# Patient Record
Sex: MALE | Race: WHITE | NOT HISPANIC OR LATINO | Employment: OTHER | URBAN - METROPOLITAN AREA
[De-identification: names, ages, dates, MRNs, and addresses within clinical notes are randomized per-mention and may not be internally consistent; named-entity substitution may affect disease eponyms.]

---

## 2020-02-01 ENCOUNTER — HOSPITAL ENCOUNTER (INPATIENT)
Facility: HOSPITAL | Age: 75
LOS: 1 days | Discharge: HOME | End: 2020-02-02
Attending: INTERNAL MEDICINE | Admitting: INTERNAL MEDICINE

## 2020-02-01 DIAGNOSIS — K92.2 GASTROINTESTINAL HEMORRHAGE, UNSPECIFIED GASTROINTESTINAL HEMORRHAGE TYPE: Primary | ICD-10-CM

## 2020-02-01 PROBLEM — S80.12XD: Status: ACTIVE | Noted: 2020-02-01

## 2020-02-01 PROBLEM — E78.5 HLD (HYPERLIPIDEMIA): Status: ACTIVE | Noted: 2020-02-01

## 2020-02-01 PROBLEM — R29.6 FREQUENT FALLS: Status: ACTIVE | Noted: 2020-02-01

## 2020-02-01 PROBLEM — Z86.79 HISTORY OF SUBARACHNOID HEMORRHAGE: Status: ACTIVE | Noted: 2020-02-01

## 2020-02-01 PROBLEM — I10 CHRONIC HYPERTENSION: Status: ACTIVE | Noted: 2020-02-01

## 2020-02-01 PROBLEM — I25.10 CAD (CORONARY ARTERY DISEASE): Status: ACTIVE | Noted: 2020-02-01

## 2020-02-01 LAB
ALBUMIN SERPL-MCNC: 4 G/DL (ref 3.5–5.2)
ALBUMIN/GLOB SERPL: 2.4 G/DL
ALP SERPL-CCNC: 47 U/L (ref 39–117)
ALT SERPL W P-5'-P-CCNC: 14 U/L (ref 1–41)
ANION GAP SERPL CALCULATED.3IONS-SCNC: 10.9 MMOL/L (ref 5–15)
APTT PPP: 31.2 SECONDS (ref 22.7–35.4)
AST SERPL-CCNC: 14 U/L (ref 1–40)
BASOPHILS # BLD AUTO: 0.01 10*3/MM3 (ref 0–0.2)
BASOPHILS NFR BLD AUTO: 0.2 % (ref 0–1.5)
BILIRUB SERPL-MCNC: 0.2 MG/DL (ref 0.2–1.2)
BUN BLD-MCNC: 19 MG/DL (ref 8–23)
BUN/CREAT SERPL: 20.4 (ref 7–25)
CALCIUM SPEC-SCNC: 8.5 MG/DL (ref 8.6–10.5)
CHLORIDE SERPL-SCNC: 102 MMOL/L (ref 98–107)
CO2 SERPL-SCNC: 26.1 MMOL/L (ref 22–29)
CREAT BLD-MCNC: 0.93 MG/DL (ref 0.76–1.27)
DEPRECATED RDW RBC AUTO: 45.3 FL (ref 37–54)
EOSINOPHIL # BLD AUTO: 0.08 10*3/MM3 (ref 0–0.4)
EOSINOPHIL NFR BLD AUTO: 1.5 % (ref 0.3–6.2)
ERYTHROCYTE [DISTWIDTH] IN BLOOD BY AUTOMATED COUNT: 13.4 % (ref 12.3–15.4)
GFR SERPL CREATININE-BSD FRML MDRD: 79 ML/MIN/1.73
GLOBULIN UR ELPH-MCNC: 1.7 GM/DL
GLUCOSE BLD-MCNC: 98 MG/DL (ref 65–99)
HCT VFR BLD AUTO: 36.5 % (ref 37.5–51)
HCT VFR BLD AUTO: 37.5 % (ref 37.5–51)
HEMOCCULT STL QL: POSITIVE
HGB BLD-MCNC: 12.2 G/DL (ref 13–17.7)
HGB BLD-MCNC: 12.8 G/DL (ref 13–17.7)
IMM GRANULOCYTES # BLD AUTO: 0.05 10*3/MM3 (ref 0–0.05)
IMM GRANULOCYTES NFR BLD AUTO: 1 % (ref 0–0.5)
INR PPP: 1 (ref 0.9–1.1)
LYMPHOCYTES # BLD AUTO: 1.26 10*3/MM3 (ref 0.7–3.1)
LYMPHOCYTES NFR BLD AUTO: 24 % (ref 19.6–45.3)
MCH RBC QN AUTO: 31.8 PG (ref 26.6–33)
MCHC RBC AUTO-ENTMCNC: 34.1 G/DL (ref 31.5–35.7)
MCV RBC AUTO: 93.3 FL (ref 79–97)
MONOCYTES # BLD AUTO: 0.4 10*3/MM3 (ref 0.1–0.9)
MONOCYTES NFR BLD AUTO: 7.6 % (ref 5–12)
NEUTROPHILS # BLD AUTO: 3.45 10*3/MM3 (ref 1.7–7)
NEUTROPHILS NFR BLD AUTO: 65.7 % (ref 42.7–76)
NRBC BLD AUTO-RTO: 0 /100 WBC (ref 0–0.2)
NT-PROBNP SERPL-MCNC: 122.7 PG/ML (ref 5–1800)
PLATELET # BLD AUTO: 173 10*3/MM3 (ref 140–450)
PMV BLD AUTO: 9.8 FL (ref 6–12)
POTASSIUM BLD-SCNC: 4.5 MMOL/L (ref 3.5–5.2)
PROT SERPL-MCNC: 5.7 G/DL (ref 6–8.5)
PROTHROMBIN TIME: 12.9 SECONDS (ref 11.7–14.2)
RBC # BLD AUTO: 4.02 10*6/MM3 (ref 4.14–5.8)
SODIUM BLD-SCNC: 139 MMOL/L (ref 136–145)
TROPONIN T SERPL-MCNC: <0.01 NG/ML (ref 0–0.03)
TROPONIN T SERPL-MCNC: <0.01 NG/ML (ref 0–0.03)
WBC NRBC COR # BLD: 5.25 10*3/MM3 (ref 3.4–10.8)

## 2020-02-01 PROCEDURE — 85730 THROMBOPLASTIN TIME PARTIAL: CPT | Performed by: HOSPITALIST

## 2020-02-01 PROCEDURE — 93005 ELECTROCARDIOGRAM TRACING: CPT | Performed by: HOSPITALIST

## 2020-02-01 PROCEDURE — 85014 HEMATOCRIT: CPT | Performed by: INTERNAL MEDICINE

## 2020-02-01 PROCEDURE — 93010 ELECTROCARDIOGRAM REPORT: CPT | Performed by: INTERNAL MEDICINE

## 2020-02-01 PROCEDURE — 85018 HEMOGLOBIN: CPT | Performed by: INTERNAL MEDICINE

## 2020-02-01 PROCEDURE — 84484 ASSAY OF TROPONIN QUANT: CPT | Performed by: NURSE PRACTITIONER

## 2020-02-01 PROCEDURE — 82272 OCCULT BLD FECES 1-3 TESTS: CPT | Performed by: NURSE PRACTITIONER

## 2020-02-01 PROCEDURE — 85025 COMPLETE CBC W/AUTO DIFF WBC: CPT | Performed by: HOSPITALIST

## 2020-02-01 PROCEDURE — 80053 COMPREHEN METABOLIC PANEL: CPT | Performed by: HOSPITALIST

## 2020-02-01 PROCEDURE — 84484 ASSAY OF TROPONIN QUANT: CPT | Performed by: HOSPITALIST

## 2020-02-01 PROCEDURE — 83880 ASSAY OF NATRIURETIC PEPTIDE: CPT | Performed by: HOSPITALIST

## 2020-02-01 PROCEDURE — 99222 1ST HOSP IP/OBS MODERATE 55: CPT | Performed by: INTERNAL MEDICINE

## 2020-02-01 PROCEDURE — 85610 PROTHROMBIN TIME: CPT | Performed by: HOSPITALIST

## 2020-02-01 RX ORDER — DIAZEPAM 5 MG/1
10 TABLET ORAL EVERY 12 HOURS PRN
Status: DISCONTINUED | OUTPATIENT
Start: 2020-02-01 | End: 2020-02-02 | Stop reason: HOSPADM

## 2020-02-01 RX ORDER — SODIUM CHLORIDE 0.9 % (FLUSH) 0.9 %
10 SYRINGE (ML) INJECTION AS NEEDED
Status: DISCONTINUED | OUTPATIENT
Start: 2020-02-01 | End: 2020-02-02 | Stop reason: HOSPADM

## 2020-02-01 RX ORDER — SODIUM CHLORIDE 0.9 % (FLUSH) 0.9 %
10 SYRINGE (ML) INJECTION EVERY 12 HOURS SCHEDULED
Status: DISCONTINUED | OUTPATIENT
Start: 2020-02-01 | End: 2020-02-02 | Stop reason: HOSPADM

## 2020-02-01 RX ORDER — SODIUM CHLORIDE 9 MG/ML
75 INJECTION, SOLUTION INTRAVENOUS CONTINUOUS
Status: DISCONTINUED | OUTPATIENT
Start: 2020-02-01 | End: 2020-02-02 | Stop reason: HOSPADM

## 2020-02-01 RX ORDER — CHOLECALCIFEROL (VITAMIN D3) 125 MCG
1000 CAPSULE ORAL DAILY
Status: DISCONTINUED | OUTPATIENT
Start: 2020-02-01 | End: 2020-02-02 | Stop reason: HOSPADM

## 2020-02-01 RX ORDER — BUPROPION HYDROCHLORIDE 150 MG/1
150 TABLET ORAL DAILY
COMMUNITY

## 2020-02-01 RX ORDER — BUPROPION HYDROCHLORIDE 150 MG/1
150 TABLET ORAL DAILY
Status: DISCONTINUED | OUTPATIENT
Start: 2020-02-01 | End: 2020-02-02 | Stop reason: HOSPADM

## 2020-02-01 RX ORDER — LISINOPRIL 10 MG/1
10 TABLET ORAL DAILY
Status: DISCONTINUED | OUTPATIENT
Start: 2020-02-01 | End: 2020-02-02 | Stop reason: HOSPADM

## 2020-02-01 RX ORDER — PANTOPRAZOLE SODIUM 40 MG/1
80 TABLET, DELAYED RELEASE ORAL
Status: DISCONTINUED | OUTPATIENT
Start: 2020-02-01 | End: 2020-02-02 | Stop reason: HOSPADM

## 2020-02-01 RX ORDER — LANOLIN ALCOHOL/MO/W.PET/CERES
1000 CREAM (GRAM) TOPICAL DAILY
COMMUNITY

## 2020-02-01 RX ORDER — FUROSEMIDE 40 MG/1
40 TABLET ORAL DAILY
COMMUNITY

## 2020-02-01 RX ORDER — BISACODYL 5 MG/1
20 TABLET, DELAYED RELEASE ORAL ONCE
Status: COMPLETED | OUTPATIENT
Start: 2020-02-01 | End: 2020-02-01

## 2020-02-01 RX ORDER — ACETAMINOPHEN 325 MG/1
650 TABLET ORAL EVERY 4 HOURS PRN
Status: DISCONTINUED | OUTPATIENT
Start: 2020-02-01 | End: 2020-02-02 | Stop reason: HOSPADM

## 2020-02-01 RX ORDER — DIAZEPAM 10 MG/1
10 TABLET ORAL EVERY 12 HOURS PRN
COMMUNITY

## 2020-02-01 RX ORDER — ACETAMINOPHEN 650 MG/1
650 SUPPOSITORY RECTAL EVERY 4 HOURS PRN
Status: DISCONTINUED | OUTPATIENT
Start: 2020-02-01 | End: 2020-02-02 | Stop reason: HOSPADM

## 2020-02-01 RX ORDER — PANTOPRAZOLE SODIUM 40 MG/1
40 TABLET, DELAYED RELEASE ORAL DAILY
COMMUNITY

## 2020-02-01 RX ORDER — LIDOCAINE 50 MG/G
1 PATCH TOPICAL
Status: DISCONTINUED | OUTPATIENT
Start: 2020-02-01 | End: 2020-02-02 | Stop reason: HOSPADM

## 2020-02-01 RX ORDER — ROSUVASTATIN CALCIUM 20 MG/1
20 TABLET, COATED ORAL DAILY
Status: DISCONTINUED | OUTPATIENT
Start: 2020-02-01 | End: 2020-02-02 | Stop reason: HOSPADM

## 2020-02-01 RX ORDER — ROSUVASTATIN CALCIUM 20 MG/1
20 TABLET, COATED ORAL DAILY
COMMUNITY

## 2020-02-01 RX ORDER — ACETAMINOPHEN 160 MG/5ML
650 SOLUTION ORAL EVERY 4 HOURS PRN
Status: DISCONTINUED | OUTPATIENT
Start: 2020-02-01 | End: 2020-02-02 | Stop reason: HOSPADM

## 2020-02-01 RX ORDER — LISINOPRIL 10 MG/1
10 TABLET ORAL DAILY
COMMUNITY

## 2020-02-01 RX ORDER — SILDENAFIL CITRATE 20 MG/1
20 TABLET ORAL DAILY PRN
COMMUNITY

## 2020-02-01 RX ADMIN — BUPROPION HYDROCHLORIDE 150 MG: 150 TABLET, FILM COATED, EXTENDED RELEASE ORAL at 12:49

## 2020-02-01 RX ADMIN — SODIUM CHLORIDE, PRESERVATIVE FREE 10 ML: 5 INJECTION INTRAVENOUS at 20:26

## 2020-02-01 RX ADMIN — BISACODYL 20 MG: 5 TABLET ORAL at 17:01

## 2020-02-01 RX ADMIN — ROSUVASTATIN CALCIUM 20 MG: 20 TABLET, FILM COATED ORAL at 12:47

## 2020-02-01 RX ADMIN — SODIUM CHLORIDE, PRESERVATIVE FREE 10 ML: 5 INJECTION INTRAVENOUS at 12:51

## 2020-02-01 RX ADMIN — LIDOCAINE 1 PATCH: 50 PATCH CUTANEOUS at 17:02

## 2020-02-01 RX ADMIN — POLYETHYLENE GLYCOL 3350 238 G: 17 POWDER, FOR SOLUTION ORAL at 17:03

## 2020-02-01 RX ADMIN — SODIUM CHLORIDE 75 ML/HR: 9 INJECTION, SOLUTION INTRAVENOUS at 12:50

## 2020-02-01 RX ADMIN — PANTOPRAZOLE SODIUM 80 MG: 40 TABLET, DELAYED RELEASE ORAL at 17:03

## 2020-02-01 RX ADMIN — LISINOPRIL 10 MG: 10 TABLET ORAL at 12:47

## 2020-02-01 RX ADMIN — Medication 1000 MCG: at 12:46

## 2020-02-01 NOTE — H&P
"    Patient Name:  Jaswant Ríos  YOB: 1945  MRN:  [id number]  Admit Date:  2/1/2020  Patient Care Team:  Provider, No Known as PCP - General      Subjective   History Present Illness     Mr. Ríos is a 75 y.o. former smoker with a history of HTN, HLD, GERD, intracranial subdural hematoma w/ subarachnoid hemorrhage, traumatic (2015), CAD who admitted for GI bleed.    Patient reports history of multiple falls with subsequent intracranial subdural hematoma and subarachnoid hemorrhage (2015) & most recent fall 1 week ago with head strike reportedly secondary to \"balance problem\" without chest pain, LOC, shortness of air & reportedly went to [facility] ER in [place] for evaluation.  Patient states CT head, left leg x-ray, right arm x-ray negative trauma -- request made to nursing for most recent CT head in [place].      Patient reports LLE pain 2/2 fall & took Aleve yesterday morning then felt like he was going to have diarrhea & noted \"bright red\" blood per rectum x2 at home went to [facility] ER for further evaluation where he states he had 3 more episodes of bright red blood per rectum with clots.  Denies use of anticoagulant or chronic NSAIDs.  Currently not taking aspirin status post subarachnoid hemorrhage.  Patient transferred to [facility] for further evaluation.  Patient reports left lower quadrant abdominal pain denies nausea, vomiting, chest pain, shortness of air, diarrhea, or urinary complaints.     Further recommendations per hospital course.  Please see assessment plan below for further details.    History of Present Illness  Review of Systems   Constitutional: Negative for chills and fever.   HENT: Negative for congestion and sore throat.    Respiratory: Negative for cough, shortness of breath and wheezing.    Cardiovascular: Negative for chest pain and leg swelling.   Gastrointestinal: Positive for blood in stool. Negative for " abdominal pain, constipation, diarrhea, nausea and vomiting.   Endocrine: Negative for polydipsia, polyphagia and polyuria.   Genitourinary: Negative for decreased urine volume, difficulty urinating and dysuria.   Musculoskeletal: Positive for arthralgias (LLE s/p recent fall ) and myalgias (LLE s/p recent fall ).   Skin: Negative for rash and wound.   Neurological: Negative for dizziness, syncope, weakness, light-headedness and headaches.   Psychiatric/Behavioral: Negative for confusion and hallucinations.        Personal History     Past Medical History:   Diagnosis Date   • Asbestos pleurisy    • BPH (benign prostatic hyperplasia)    • Coronary artery disease    • Esophagitis    • Ex-cigarette smoker     Quit 15 years ago   • GERD (gastroesophageal reflux disease)    • Hyperlipidemia    • Hypertension    • Neuropathy    • Pleural effusion    • Seasonal depression (CMS/[facility])    • Subarachnoid hemorrhage (CMS/[facility]) 05/17/2017     Past Surgical History:   Procedure Laterality Date   • BACK SURGERY     • CARDIAC SURGERY     • CATARACT EXTRACTION, BILATERAL     • COLONOSCOPY     • CORONARY ARTERY BYPASS GRAFT     • ENDOSCOPY       No family history on file.  Social History     Tobacco Use   • Smoking status: Former Smoker     Packs/day: 1.50     Years: 15.00     Pack years: 22.50     Types: Cigarettes     Start date: 1957     Last attempt to quit: 2005     Years since quitting: 15.0   • Smokeless tobacco: Never Used   Substance Use Topics   • Alcohol use: Not on file   • Drug use: Not on file     No current facility-administered medications on file prior to encounter.      Current Outpatient Medications on File Prior to Encounter   Medication Sig Dispense Refill   • buPROPion XL (WELLBUTRIN XL) 150 MG 24 hr tablet Take 150 mg by mouth Daily.     • diazePAM (VALIUM) 10 MG tablet Take 10 mg by mouth Every 12 (Twelve) Hours As Needed for Anxiety.     • furosemide (LASIX) 40 MG tablet Take 40 mg by mouth Daily.     •  "lisinopril (PRINIVIL,ZESTRIL) 10 MG tablet Take 10 mg by mouth Daily.     • Multiple Vitamin (MULTI-VITAMIN DAILY PO) Take 1 tablet by mouth Daily.     • pantoprazole (PROTONIX) 40 MG EC tablet Take 40 mg by mouth Daily.     • rosuvastatin (CRESTOR) 20 MG tablet Take 20 mg by mouth Daily.     • sildenafil (REVATIO) 20 MG tablet Take 20 mg by mouth Daily As Needed (Takes 3 tablets daily prn).     • vitamin B-12 (CYANOCOBALAMIN) 1000 MCG tablet Take 1,000 mcg by mouth Daily.       Allergies   Allergen Reactions   • Percocet [Oxycodone-Acetaminophen] Hallucinations   • Ciprofloxacin GI Intolerance   • Duloxetine Hcl Other (See Comments)     \"feels weird\"       Objective    Objective     Vital Signs  Temp:  [97.7 °F (36.5 °C)] 97.7 °F (36.5 °C)  Heart Rate:  [72] 72  Resp:  [16] 16  BP: (127)/(78) 127/78     on   ;   Device (Oxygen Therapy): room air  Body mass index is 28.62 kg/m².    Physical Exam   Constitutional: He is oriented to person, place, and time. No distress.   HENT:   Head: Normocephalic and atraumatic.   Eyes: Pupils are equal, round, and reactive to light. EOM are normal.   Neck: Normal range of motion. Neck supple.   Cardiovascular: Normal rate and normal heart sounds.   Pulmonary/Chest: Effort normal and breath sounds normal. No respiratory distress. He has no wheezes.   Abdominal: Soft. Bowel sounds are normal. He exhibits distension. There is tenderness (LLQ abdominal ).   Musculoskeletal: He exhibits tenderness (LLE). He exhibits no edema or deformity.   Neurological: He is alert and oriented to person, place, and time. No cranial nerve deficit or sensory deficit.   Skin: Skin is warm and dry. No rash noted. He is not diaphoretic.   Psychiatric: He has a normal mood and affect. His behavior is normal. Judgment and thought content normal.       Results Review:  I reviewed the patient's new clinical results.  I reviewed the patient's new imaging results and agree with the interpretation.  I reviewed " the patient's other test results and agree with the interpretation  I personally viewed and interpreted the patient's EKG/Telemetry data  Discussed with ED provider.    Lab Results (last 24 hours)     Procedure Component Value Units Date/Time    CBC & Differential [[id number]] Collected:  02/01/20 1057    Specimen:  Blood Updated:  02/01/20 1113    Narrative:       The following orders were created for panel order CBC & Differential.  Procedure                               Abnormality         Status                     ---------                               -----------         ------                     CBC Auto Differential[[id number]]        Abnormal            Final result                 Please view results for these tests on the individual orders.    Protime-INR [[id number]] Collected:  02/01/20 1057    Specimen:  Blood Updated:  02/01/20 1103    aPTT [[id number]] Collected:  02/01/20 1057    Specimen:  Blood Updated:  02/01/20 1103    CBC Auto Differential [[id number]]  (Abnormal) Collected:  02/01/20 1057    Specimen:  Blood Updated:  02/01/20 1113     WBC 5.25 10*3/mm3      RBC 4.02 10*6/mm3      Hemoglobin 12.8 g/dL      Hematocrit 37.5 %      MCV 93.3 fL      MCH 31.8 pg      MCHC 34.1 g/dL      RDW 13.4 %      RDW-SD 45.3 fl      MPV 9.8 fL      Platelets 173 10*3/mm3      Neutrophil % 65.7 %      Lymphocyte % 24.0 %      Monocyte % 7.6 %      Eosinophil % 1.5 %      Basophil % 0.2 %      Immature Grans % 1.0 %      Neutrophils, Absolute 3.45 10*3/mm3      Lymphocytes, Absolute 1.26 10*3/mm3      Monocytes, Absolute 0.40 10*3/mm3      Eosinophils, Absolute 0.08 10*3/mm3      Basophils, Absolute 0.01 10*3/mm3      Immature Grans, Absolute 0.05 10*3/mm3      nRBC 0.0 /100 WBC     Comprehensive Metabolic Panel [[id number]] Collected:  02/01/20 1057    Specimen:  Blood Updated:  02/01/20 1103    Troponin [[id number]] Collected:  02/01/20 1057    Specimen:  Blood Updated:  02/01/20 1103    BNP [[id number]]  Collected:  02/01/20 1057    Specimen:  Blood Updated:  02/01/20 1103          Imaging Results (Last 24 Hours)     ** No results found for the last 24 hours. **               ECG 12 Lead   Preliminary Result   HEART RATE= 63  bpm   RR Interval= 944  ms   FL Interval= 189  ms   P Horizontal Axis= 13  deg   P Front Axis= 64  deg   QRSD Interval= 116  ms   QT Interval= 396  ms   QRS Axis= 104  deg   T Wave Axis= 105  deg   - ABNORMAL ECG -   Sinus arrhythmia   Incomplete right bundle branch block   Low voltage, extremity leads   Nonspecific T abnormalities, lateral leads   Electronically Signed By:    Date and Time of Study: 2020-02-01 11:10:38           Assessment/Plan     Active Hospital Problems    Diagnosis  POA   • **GI bleed [K92.2]  Yes   • CAD (coronary artery disease) [I25.10]  Unknown   • Chronic hypertension [I10]  Yes   • HLD (hyperlipidemia) [E78.5]  Yes   • History of subarachnoid hemorrhage [Z86.79]  Not Applicable   • Frequent falls [R29.6]  Not Applicable   • Contusion of leg, left, subsequent encounter [S80.12XD]  Not Applicable      Resolved Hospital Problems   No resolved problems to display.       Mr. Ríos is a 75 y.o. former smoker with a history of HTN, HLD, GERD, intracranial subdural hematoma w/ subarachnoid hemorrhage, traumatic (2015), CAD who admitted for GI bleed.      GI bleed   Consult GI   Stool hemoccult   Monitor hgb 13.8 (@ [facility]),    ([facility]) CT abdominal pelvis without contrast impression colonic diverticulosis without evidence of diverticulitis, calcified lesion/cyst in the spleen, of indeterminate etiology, septal nonspecific bilateral perinephric fat stranding, which may be due to intravenous infusion versus pyelonephritis, a 3 cm complex cyst vs mass in the right kidney...      CAD (coronary artery disease)    Chronic hypertension   Controlled   Hold lisinopril SBP <120      HLD (hyperlipidemia)   Statin      History of subarachnoid  hemorrhage   Avoid AC, NSAIDs      Frequent falls   PT consult      Contusion of leg, left, subsequent encounter   Lidoderm patch    · I discussed the patient's findings and my recommendations with Dr. Rolle    VTE Prophylaxis - SCD  Code Status - CPR       JEMIMA Joshi  [facility]  02/01/20  11:31 AM

## 2020-02-01 NOTE — NURSING NOTE
Patient arrived to unit via stretcher from [facility] ED. Patient AOx4, ambulatory, VSS on room air. Handoff report given to oncdanica Moya.

## 2020-02-01 NOTE — CONSULTS
[facility]  Initial Inpatient Consult Note    Referring Provider: A    Reason for Consultation: Rectal bleeding    Subjective     History of present illness:    75 y.o. male, not previously known to our service, that was transferred from [facility] for rectal bleeding that began last night.  Patient reports he was in his normal state of health and went to have a bowel movement last night with the passage of a large amount of bright red blood.  He had multiple subsequent episodes.  He has never seen blood in his stool previously.  He has had multiple prior colonoscopies with no abnormal findings.  He had a CT scan at the [facility] which was notable for diverticulosis.  He does not take blood thinners.  He does report that he has been slightly constipated due to using a medication for dizziness this week which was prescribed after a fall last week.  This is since been discontinued.  He does report taking 2 Aleve yesterday but has taken no other NSAIDs.    He has no family history of colorectal cancer or polyps.  He denies abdominal pain nausea or vomiting.  He has had no further bleeding this morning.  Hemoglobin this morning was 12.8.    Past Medical History:  Past Medical History:   Diagnosis Date   • Asbestos pleurisy    • BPH (benign prostatic hyperplasia)    • Coronary artery disease    • Esophagitis    • Ex-cigarette smoker     Quit 15 years ago   • GERD (gastroesophageal reflux disease)    • Hyperlipidemia    • Hypertension    • Neuropathy    • Pleural effusion    • Seasonal depression (CMS/[facility])    • Subarachnoid hemorrhage (CMS/[facility]) 05/17/2017     Past Surgical History:  Past Surgical History:   Procedure Laterality Date   • BACK SURGERY     • CARDIAC SURGERY     • CATARACT EXTRACTION, BILATERAL     • COLONOSCOPY     • CORONARY ARTERY BYPASS GRAFT     • ENDOSCOPY        Social History:   Social History     Tobacco Use   • Smoking status: Former Smoker      "Packs/day: 1.50     Years: 15.00     Pack years: 22.50     Types: Cigarettes     Start date: 1957     Last attempt to quit: 2005     Years since quitting: 15.0   • Smokeless tobacco: Never Used   Substance Use Topics   • Alcohol use: Not on file      Family History:  No family history on file.    Home Meds:  Medications Prior to Admission   Medication Sig Dispense Refill Last Dose   • buPROPion XL (WELLBUTRIN XL) 150 MG 24 hr tablet Take 150 mg by mouth Daily.      • diazePAM (VALIUM) 10 MG tablet Take 10 mg by mouth Every 12 (Twelve) Hours As Needed for Anxiety.      • furosemide (LASIX) 40 MG tablet Take 40 mg by mouth Daily.      • lisinopril (PRINIVIL,ZESTRIL) 10 MG tablet Take 10 mg by mouth Daily.      • Multiple Vitamin (MULTI-VITAMIN DAILY PO) Take 1 tablet by mouth Daily.      • pantoprazole (PROTONIX) 40 MG EC tablet Take 40 mg by mouth Daily.      • rosuvastatin (CRESTOR) 20 MG tablet Take 20 mg by mouth Daily.      • sildenafil (REVATIO) 20 MG tablet Take 20 mg by mouth Daily As Needed (Takes 3 tablets daily prn).      • vitamin B-12 (CYANOCOBALAMIN) 1000 MCG tablet Take 1,000 mcg by mouth Daily.        Current Meds:     buPROPion  mg Oral Daily   lidocaine 1 patch Transdermal Q24H   lisinopril 10 mg Oral Daily   pantoprazole 80 mg Oral BID AC   rosuvastatin 20 mg Oral Daily   sodium chloride 10 mL Intravenous Q12H   vitamin B-12 1,000 mcg Oral Daily     Allergies:  Allergies   Allergen Reactions   • Percocet [Oxycodone-Acetaminophen] Hallucinations   • Ciprofloxacin GI Intolerance   • Duloxetine Hcl Other (See Comments)     \"feels weird\"     Review of Systems  Pertinent items are noted in HPI, all other systems reviewed and negative     Objective     Vital Signs  Temp:  [97.7 °F (36.5 °C)] 97.7 °F (36.5 °C)  Heart Rate:  [72] 72  Resp:  [16] 16  BP: (127)/(78) 127/78  Physical Exam:  General Appearance:    Alert, cooperative, in no acute distress   Head:    Normocephalic, without obvious " abnormality, atraumatic   Eyes:          conjunctivae and sclerae normal, no   icterus   Throat:   no thrush, oral mucosa moist   Neck:   Supple, no adenopathy   Lungs:     Clear to auscultation bilaterally    Heart:    Regular rhythm and normal rate    Chest Wall:    No abnormalities observed   Abdomen:     Soft, nondistended, nontender; normal bowel sounds   Extremities:   no edema, no redness   Skin:   No bruising or rash   Psychiatric:  normal mood and insight     Results Review:   I reviewed the patient's new clinical results.    Results from last 7 days   Lab Units 02/01/20  1057   WBC 10*3/mm3 5.25   HEMOGLOBIN g/dL 12.8*   HEMATOCRIT % 37.5   PLATELETS 10*3/mm3 173     Results from last 7 days   Lab Units 02/01/20  1057   SODIUM mmol/L 139   POTASSIUM mmol/L 4.5   CHLORIDE mmol/L 102   CO2 mmol/L 26.1   BUN mg/dL 19   CREATININE mg/dL 0.93   CALCIUM mg/dL 8.5*   BILIRUBIN mg/dL 0.2   ALK PHOS U/L 47   ALT (SGPT) U/L 14   AST (SGOT) U/L 14   GLUCOSE mg/dL 98     Results from last 7 days   Lab Units 02/01/20  1057   INR  1.00     No results found for: LIPASE    Radiology:  No orders to display       Assessment/Plan   Patient Active Problem List   Diagnosis   • GI bleed   • CAD (coronary artery disease)   • Chronic hypertension   • HLD (hyperlipidemia)   • History of subarachnoid hemorrhage   • Frequent falls   • Contusion of leg, left, subsequent encounter       Assessment:  1. Rectal bleeding  2. Anemia    Plan:  · Follow serial H&H  · Will plan for colonoscopy tomorrow for further evaluation of his bleeding following colonoscopy prep  · Further plans based upon the results of endoscopy-we will follow closely in the interim      I discussed the patients findings and my recommendations with patient and nursing staff.    Honey Arellano MD

## 2020-02-02 ENCOUNTER — ANESTHESIA (OUTPATIENT)
Dept: GASTROENTEROLOGY | Facility: HOSPITAL | Age: 75
End: 2020-02-02

## 2020-02-02 ENCOUNTER — ANESTHESIA EVENT (OUTPATIENT)
Dept: GASTROENTEROLOGY | Facility: HOSPITAL | Age: 75
End: 2020-02-02

## 2020-02-02 VITALS
RESPIRATION RATE: 16 BRPM | OXYGEN SATURATION: 94 % | BODY MASS INDEX: 28.54 KG/M2 | WEIGHT: 192.68 LBS | DIASTOLIC BLOOD PRESSURE: 92 MMHG | TEMPERATURE: 98.1 F | SYSTOLIC BLOOD PRESSURE: 151 MMHG | HEART RATE: 71 BPM | HEIGHT: 69 IN

## 2020-02-02 PROBLEM — N28.1 COMPLEX RENAL CYST: Status: ACTIVE | Noted: 2020-02-02

## 2020-02-02 LAB
ANION GAP SERPL CALCULATED.3IONS-SCNC: 11.2 MMOL/L (ref 5–15)
BASOPHILS # BLD AUTO: 0.02 10*3/MM3 (ref 0–0.2)
BASOPHILS NFR BLD AUTO: 0.4 % (ref 0–1.5)
BUN BLD-MCNC: 11 MG/DL (ref 8–23)
BUN/CREAT SERPL: 12.9 (ref 7–25)
CALCIUM SPEC-SCNC: 8.1 MG/DL (ref 8.6–10.5)
CHLORIDE SERPL-SCNC: 105 MMOL/L (ref 98–107)
CO2 SERPL-SCNC: 24.8 MMOL/L (ref 22–29)
CREAT BLD-MCNC: 0.85 MG/DL (ref 0.76–1.27)
DEPRECATED RDW RBC AUTO: 49.1 FL (ref 37–54)
EOSINOPHIL # BLD AUTO: 0.17 10*3/MM3 (ref 0–0.4)
EOSINOPHIL NFR BLD AUTO: 3.1 % (ref 0.3–6.2)
ERYTHROCYTE [DISTWIDTH] IN BLOOD BY AUTOMATED COUNT: 13.7 % (ref 12.3–15.4)
GFR SERPL CREATININE-BSD FRML MDRD: 88 ML/MIN/1.73
GLUCOSE BLD-MCNC: 96 MG/DL (ref 65–99)
HCT VFR BLD AUTO: 37.2 % (ref 37.5–51)
HCT VFR BLD AUTO: 37.5 % (ref 37.5–51)
HGB BLD-MCNC: 12.4 G/DL (ref 13–17.7)
HGB BLD-MCNC: 12.4 G/DL (ref 13–17.7)
IMM GRANULOCYTES # BLD AUTO: 0.03 10*3/MM3 (ref 0–0.05)
IMM GRANULOCYTES NFR BLD AUTO: 0.6 % (ref 0–0.5)
LYMPHOCYTES # BLD AUTO: 1.3 10*3/MM3 (ref 0.7–3.1)
LYMPHOCYTES NFR BLD AUTO: 23.9 % (ref 19.6–45.3)
MCH RBC QN AUTO: 31.8 PG (ref 26.6–33)
MCHC RBC AUTO-ENTMCNC: 33.1 G/DL (ref 31.5–35.7)
MCV RBC AUTO: 96.2 FL (ref 79–97)
MONOCYTES # BLD AUTO: 0.49 10*3/MM3 (ref 0.1–0.9)
MONOCYTES NFR BLD AUTO: 9 % (ref 5–12)
NEUTROPHILS # BLD AUTO: 3.42 10*3/MM3 (ref 1.7–7)
NEUTROPHILS NFR BLD AUTO: 63 % (ref 42.7–76)
NRBC BLD AUTO-RTO: 0 /100 WBC (ref 0–0.2)
PLATELET # BLD AUTO: 169 10*3/MM3 (ref 140–450)
PMV BLD AUTO: 10.1 FL (ref 6–12)
POTASSIUM BLD-SCNC: 4.2 MMOL/L (ref 3.5–5.2)
RBC # BLD AUTO: 3.9 10*6/MM3 (ref 4.14–5.8)
SODIUM BLD-SCNC: 141 MMOL/L (ref 136–145)
WBC NRBC COR # BLD: 5.43 10*3/MM3 (ref 3.4–10.8)

## 2020-02-02 PROCEDURE — 0DJD8ZZ INSPECTION OF LOWER INTESTINAL TRACT, VIA NATURAL OR ARTIFICIAL OPENING ENDOSCOPIC: ICD-10-PCS | Performed by: HOSPITALIST

## 2020-02-02 PROCEDURE — 25010000002 PROPOFOL 10 MG/ML EMULSION: Performed by: ANESTHESIOLOGY

## 2020-02-02 PROCEDURE — 80048 BASIC METABOLIC PNL TOTAL CA: CPT | Performed by: HOSPITALIST

## 2020-02-02 PROCEDURE — 45378 DIAGNOSTIC COLONOSCOPY: CPT | Performed by: INTERNAL MEDICINE

## 2020-02-02 PROCEDURE — 85025 COMPLETE CBC W/AUTO DIFF WBC: CPT | Performed by: HOSPITALIST

## 2020-02-02 RX ORDER — SODIUM CHLORIDE 9 MG/ML
1000 INJECTION, SOLUTION INTRAVENOUS CONTINUOUS
Status: DISCONTINUED | OUTPATIENT
Start: 2020-02-02 | End: 2020-02-02

## 2020-02-02 RX ORDER — LIDOCAINE HYDROCHLORIDE 20 MG/ML
INJECTION, SOLUTION INFILTRATION; PERINEURAL AS NEEDED
Status: DISCONTINUED | OUTPATIENT
Start: 2020-02-02 | End: 2020-02-02 | Stop reason: SURG

## 2020-02-02 RX ORDER — PROPOFOL 10 MG/ML
VIAL (ML) INTRAVENOUS AS NEEDED
Status: DISCONTINUED | OUTPATIENT
Start: 2020-02-02 | End: 2020-02-02 | Stop reason: SURG

## 2020-02-02 RX ORDER — WHEAT DEXTRIN 3 G/4 G
1 POWDER IN PACKET (EA) ORAL DAILY
Status: DISCONTINUED | OUTPATIENT
Start: 2020-02-02 | End: 2020-02-02 | Stop reason: HOSPADM

## 2020-02-02 RX ADMIN — Medication 1000 MCG: at 08:17

## 2020-02-02 RX ADMIN — SODIUM CHLORIDE 75 ML/HR: 9 INJECTION, SOLUTION INTRAVENOUS at 02:28

## 2020-02-02 RX ADMIN — ROSUVASTATIN CALCIUM 20 MG: 20 TABLET, FILM COATED ORAL at 08:17

## 2020-02-02 RX ADMIN — LIDOCAINE 1 PATCH: 50 PATCH CUTANEOUS at 08:17

## 2020-02-02 RX ADMIN — BUPROPION HYDROCHLORIDE 150 MG: 150 TABLET, FILM COATED, EXTENDED RELEASE ORAL at 08:17

## 2020-02-02 RX ADMIN — Medication 1 EACH: at 12:45

## 2020-02-02 RX ADMIN — LISINOPRIL 10 MG: 10 TABLET ORAL at 08:17

## 2020-02-02 RX ADMIN — LIDOCAINE HYDROCHLORIDE 80 MG: 20 INJECTION, SOLUTION INFILTRATION; PERINEURAL at 09:43

## 2020-02-02 RX ADMIN — PROPOFOL 300 MG: 10 INJECTION, EMULSION INTRAVENOUS at 09:43

## 2020-02-02 RX ADMIN — SODIUM CHLORIDE 1000 ML: 9 INJECTION, SOLUTION INTRAVENOUS at 09:01

## 2020-02-02 NOTE — PLAN OF CARE
Problem: Patient Care Overview  Goal: Plan of Care Review  Outcome: Ongoing (interventions implemented as appropriate)  Flowsheets (Taken 2/2/2020 5599)  Progress: no change  Plan of Care Reviewed With: patient  Outcome Summary: VSS. Patient completed bowel prep overnight for C-Scope today. 2L oxygen placed on patient d/t desating in the low 80's while sleeping. Hgb remains stable. Will continue to monitor.     Problem: Fall Risk (Adult)  Goal: Identify Related Risk Factors and Signs and Symptoms  Outcome: Ongoing (interventions implemented as appropriate)     Problem: Gastrointestinal Bleeding (Adult)  Goal: Signs and Symptoms of Listed Potential Problems Will be Absent, Minimized or Managed (Gastrointestinal Bleeding)  Outcome: Ongoing (interventions implemented as appropriate)

## 2020-02-02 NOTE — DISCHARGE INSTRUCTIONS
Start taking Benefiber once daily.  Follow up with PCP to get an outpatient sleep study scheduled.

## 2020-02-02 NOTE — ANESTHESIA PREPROCEDURE EVALUATION
Anesthesia Evaluation     NPO Solid Status: > 8 hours  NPO Liquid Status: > 8 hours           Airway   Mallampati: II  TM distance: >3 FB  Neck ROM: full  no difficulty expected  Dental - normal exam     Pulmonary - normal exam   (+) pleural effusion, a smoker Former,   Cardiovascular - normal exam    (+) hypertension, CAD, CABG, hyperlipidemia,       Neuro/Psych  (+) psychiatric history Depression,     GI/Hepatic/Renal/Endo    (+)  GERD, GI bleeding ,     Musculoskeletal     Abdominal  - normal exam   Substance History      OB/GYN          Other                        Anesthesia Plan    ASA 3     MAC       Anesthetic plan, all risks, benefits, and alternatives have been provided, discussed and informed consent has been obtained with: patient.

## 2020-02-02 NOTE — DISCHARGE SUMMARY
"             [facility]    ASSOCIATES  [phone]    DISCHARGE SUMMARY  [facility]    Patient Identification:  Name: Jaswant Ríos  Age: 75 y.o.  Sex: male  :  1945  MRN: [id number]  Primary Care Physician: Provider, No Known    Admit date: 2020  Discharge date and time:      Discharge Diagnoses:  GI bleed    CAD (coronary artery disease)    Chronic hypertension    HLD (hyperlipidemia)    History of subarachnoid hemorrhage    Frequent falls    Contusion of leg, left, subsequent encounter    Gastrointestinal hemorrhage       History of present illness from H&P:    Mr. Ríos is a 75 y.o. former smoker with a history of HTN, HLD, GERD, intracranial subdural hematoma w/ subarachnoid hemorrhage, traumatic (), CAD who admitted for GI bleed.     Patient reports history of multiple falls with subsequent intracranial subdural hematoma and subarachnoid hemorrhage () & most recent fall 1 week ago with head strike reportedly secondary to \"balance problem\" without chest pain, LOC, shortness of air & reportedly went to [facility] in [place] for evaluation.  Patient states CT head, left leg x-ray, right arm x-ray negative trauma -- request made to nursing for most recent CT head in [place].       Patient reports LLE pain 2/2 fall & took Aleve yesterday morning then felt like he was going to have diarrhea & noted \"bright red\" blood per rectum x2 at home went to [facility] for further evaluation where he states he had 3 more episodes of bright red blood per rectum with clots.  Denies use of anticoagulant or chronic NSAIDs.  Currently not taking aspirin status post subarachnoid hemorrhage.  Patient transferred to [facility] for further evaluation.  Patient reports left lower quadrant abdominal pain denies nausea, vomiting, chest pain, shortness of air, diarrhea, or urinary complaints.     Hospital Course:     Patient was " admitted to hospital and was seen by gastroenterology.  Patient has had some minimal amount of blood in stools.  But bloody stools much better.  Patient with colonoscopy and colonoscopy was unremarkable.  Gastroenterology station there is a point they are okay for patient be discharged.  Patient's hemoglobin is remained stable during hospitalization.  Vital signs remained normal throughout hospitalization.  Patient's EKG is slightly abnormal and is to follow-up with his primary care doctor.  Troponins x2 have been unremarkable.  And the patient denies any chest pain or shortness of air.    The patient was noted to have a renal cyst on CT scan at [facility] done on 2/1/20 this is based upon our review of outside the records that were sent with the patient.  Patient understands he needs to have this followed up outpatient with urology.    He also had some mild hypoxemia and needs follow outpatient.      Further hospital course by problem list:      The patient was seen and examined on the day of discharge.    Consults:   Consults     Date and Time Order Name Status Description    2/1/2020 1047 Inpatient Gastroenterology Consult Completed           Results from last 7 days   Lab Units 02/02/20  0352   WBC 10*3/mm3 5.43   HEMOGLOBIN g/dL 12.4*   HEMATOCRIT % 37.5   PLATELETS 10*3/mm3 169       Results from last 7 days   Lab Units 02/02/20  0352   SODIUM mmol/L 141   POTASSIUM mmol/L 4.2   CHLORIDE mmol/L 105   CO2 mmol/L 24.8   BUN mg/dL 11   CREATININE mg/dL 0.85   GLUCOSE mg/dL 96   CALCIUM mg/dL 8.1*       Significant Diagnostic Studies:   WBC   Date Value Ref Range Status   02/02/2020 5.43 3.40 - 10.80 10*3/mm3 Final     Hemoglobin   Date Value Ref Range Status   02/02/2020 12.4 (L) 13.0 - 17.7 g/dL Final     Hematocrit   Date Value Ref Range Status   02/02/2020 37.5 37.5 - 51.0 % Final     Platelets   Date Value Ref Range Status   02/02/2020 169 140 - 450 10*3/mm3 Final     Sodium   Date Value  Ref Range Status   02/02/2020 141 136 - 145 mmol/L Final     Potassium   Date Value Ref Range Status   02/02/2020 4.2 3.5 - 5.2 mmol/L Final     Chloride   Date Value Ref Range Status   02/02/2020 105 98 - 107 mmol/L Final     CO2   Date Value Ref Range Status   02/02/2020 24.8 22.0 - 29.0 mmol/L Final     BUN   Date Value Ref Range Status   02/02/2020 11 8 - 23 mg/dL Final     Creatinine   Date Value Ref Range Status   02/02/2020 0.85 0.76 - 1.27 mg/dL Final     Glucose   Date Value Ref Range Status   02/02/2020 96 65 - 99 mg/dL Final     Calcium   Date Value Ref Range Status   02/02/2020 8.1 (L) 8.6 - 10.5 mg/dL Final     AST (SGOT)   Date Value Ref Range Status   02/01/2020 14 1 - 40 U/L Final     ALT (SGPT)   Date Value Ref Range Status   02/01/2020 14 1 - 41 U/L Final     Alkaline Phosphatase   Date Value Ref Range Status   02/01/2020 47 39 - 117 U/L Final     INR   Date Value Ref Range Status   02/01/2020 1.00 0.90 - 1.10 Final     No results found for: COLORU, CLARITYU, SPECGRAV, PHUR, PROTEINUR, GLUCOSEU, KETONESU, BLOODU, NITRITE, LEUKOCYTESUR, BILIRUBINUR, UROBILINOGEN, RBCUA, WBCUA, BACTERIA, UACOMMENT  Troponin T   Date Value Ref Range Status   02/01/2020 <0.010 0.000 - 0.030 ng/mL Final     No components found for: HGBA1C;2  No components found for: TSH;2    Imaging Results (All)     None      No results found for: SITE, ALLENTEST, PHART, [id number], PO2ART, KYB4HVT, BASEEXCESS, [id number], HGBBG, HCTABG, OXYHEMOGLOBI, METHHGBN, CARBOXYHGB, CO2CT, BAROMETRIC, MODALITY, FIO2       Discharge Medications      Continue These Medications      Instructions Start Date   buPROPion  MG 24 hr tablet  Commonly known as:  WELLBUTRIN XL   150 mg, Oral, Daily      diazePAM 10 MG tablet  Commonly known as:  VALIUM   10 mg, Oral, Every 12 Hours PRN      furosemide 40 MG tablet  Commonly known as:  LASIX   40 mg, Oral, Daily      lisinopril 10 MG tablet  Commonly known as:  PRINIVIL,ZESTRIL   10 mg, Oral, Daily       MULTI-VITAMIN DAILY PO   1 tablet, Oral, Daily      pantoprazole 40 MG EC tablet  Commonly known as:  PROTONIX   40 mg, Oral, Daily      rosuvastatin 20 MG tablet  Commonly known as:  CRESTOR   20 mg, Oral, Daily      sildenafil 20 MG tablet  Commonly known as:  REVATIO   20 mg, Oral, Daily PRN      vitamin B-12 1000 MCG tablet  Commonly known as:  CYANOCOBALAMIN   1,000 mcg, Oral, Daily             Patient Instructions:       No future appointments.         Discharge Order (From admission, onward)    None          Diet Order   Procedures   • Diet Regular       Discharge instructions:  Follow up with your primary care provider in 1-2 weeks with a cbc and cmp     Outpatient sleep study for nocturnal hypoxemia      Total time spent discharging patient including evaluation, post hospitalization follow up,  medication and post hospitalization instructions and education, total time exceeds 30 minutes.    Signed:  Kimo Rolle MD  2/2/2020  10:56 AM

## 2020-02-02 NOTE — ANESTHESIA POSTPROCEDURE EVALUATION
"Patient: Jaswant Ríos    Procedure Summary     Date:  02/02/20 Room / Location:   [facility] /  [facility]    Anesthesia Start:  0940 Anesthesia Stop:  1005    Procedure:  COLONOSCOPY TO CECUM (N/A ) Diagnosis:       Gastrointestinal hemorrhage, unspecified gastrointestinal hemorrhage type      (Gastrointestinal hemorrhage, unspecified gastrointestinal hemorrhage type [K92.2])    Surgeon:  Honey Arellano MD Provider:  Alan Birmingham MD    Anesthesia Type:  MAC ASA Status:  3          Anesthesia Type: MAC    Vitals  No vitals data found for the desired time range.          Post Anesthesia Care and Evaluation    Patient location during evaluation: bedside  Patient participation: complete - patient participated  Level of consciousness: awake and alert  Pain management: adequate  Airway patency: patent  Anesthetic complications: No anesthetic complications    Cardiovascular status: acceptable  Respiratory status: acceptable  Hydration status: acceptable    Comments: /89 (BP Location: Left arm, Patient Position: Sitting)   Pulse 64   Temp 36.7 °C (98.1 °F) (Oral)   Resp 18   Ht 175.3 cm (69\")   Wt 87.4 kg (192 lb 10.9 oz)   SpO2 96%   BMI 28.45 kg/m²       "

## 2020-02-02 NOTE — SIGNIFICANT NOTE
02/02/20 1152   Rehab Time/Intention   Evaluation Not Performed   (Pt ambulating independently per NSG. Discharge orders placed. No need for skilled therapy at this time. Please reconsult if change in status)   Rehab Treatment   Discipline physical therapist

## 2024-08-31 ENCOUNTER — INPATIENT HOSPITAL (OUTPATIENT)
Age: 79
End: 2024-08-31

## 2024-08-31 ENCOUNTER — INPATIENT HOSPITAL (OUTPATIENT)
Dept: URBAN - METROPOLITAN AREA HOSPITAL 107 | Facility: HOSPITAL | Age: 79
End: 2024-08-31

## 2024-08-31 DIAGNOSIS — D64.9 ANEMIA, UNSPECIFIED: ICD-10-CM

## 2024-08-31 DIAGNOSIS — R19.5 OTHER FECAL ABNORMALITIES: ICD-10-CM

## 2024-08-31 PROCEDURE — 99222 1ST HOSP IP/OBS MODERATE 55: CPT | Performed by: INTERNAL MEDICINE

## 2024-09-01 ENCOUNTER — INPATIENT HOSPITAL (OUTPATIENT)
Age: 79
End: 2024-09-01

## 2024-09-01 ENCOUNTER — INPATIENT HOSPITAL (OUTPATIENT)
Dept: URBAN - METROPOLITAN AREA HOSPITAL 107 | Facility: HOSPITAL | Age: 79
End: 2024-09-01

## 2024-09-01 DIAGNOSIS — R19.5 OTHER FECAL ABNORMALITIES: ICD-10-CM

## 2024-09-01 DIAGNOSIS — D64.9 ANEMIA, UNSPECIFIED: ICD-10-CM

## 2024-09-01 PROCEDURE — 99233 SBSQ HOSP IP/OBS HIGH 50: CPT | Performed by: INTERNAL MEDICINE

## (undated) DEVICE — TUBING, SUCTION, 1/4" X 10', STRAIGHT: Brand: MEDLINE

## (undated) DEVICE — KT ORCA ORCAPOD DISP STRL

## (undated) DEVICE — THE TORRENT IRRIGATION SCOPE CONNECTOR IS USED WITH THE TORRENT IRRIGATION TUBING TO PROVIDE IRRIGATION FLUIDS SUCH AS STERILE WATER DURING GASTROINTESTINAL ENDOSCOPIC PROCEDURES WHEN USED IN CONJUNCTION WITH AN IRRIGATION PUMP (OR ELECTROSURGICAL UNIT).: Brand: TORRENT

## (undated) DEVICE — LN SMPL CO2 SHTRM SD STREAM W/M LUER

## (undated) DEVICE — SENSR O2 OXIMAX FNGR A/ 18IN NONSTR

## (undated) DEVICE — ADAPT CLN BIOGUARD AIR/H2O DISP

## (undated) DEVICE — CANN O2 ETCO2 FITS ALL CONN CO2 SMPL A/ 7IN DISP LF